# Patient Record
Sex: FEMALE | Race: WHITE | Employment: FULL TIME | URBAN - METROPOLITAN AREA
[De-identification: names, ages, dates, MRNs, and addresses within clinical notes are randomized per-mention and may not be internally consistent; named-entity substitution may affect disease eponyms.]

---

## 2017-07-12 ENCOUNTER — OFFICE VISIT (OUTPATIENT)
Dept: FAMILY MEDICINE | Facility: CLINIC | Age: 37
End: 2017-07-12
Payer: COMMERCIAL

## 2017-07-12 VITALS
SYSTOLIC BLOOD PRESSURE: 124 MMHG | DIASTOLIC BLOOD PRESSURE: 74 MMHG | BODY MASS INDEX: 32.47 KG/M2 | WEIGHT: 194.9 LBS | RESPIRATION RATE: 15 BRPM | HEART RATE: 88 BPM | HEIGHT: 65 IN | OXYGEN SATURATION: 99 % | TEMPERATURE: 97.3 F

## 2017-07-12 DIAGNOSIS — A49.01 INFECTION DUE TO STAPHYLOCOCCUS AUREUS: ICD-10-CM

## 2017-07-12 DIAGNOSIS — L30.9 ECZEMA, UNSPECIFIED TYPE: Primary | ICD-10-CM

## 2017-07-12 PROCEDURE — 99214 OFFICE O/P EST MOD 30 MIN: CPT | Performed by: FAMILY MEDICINE

## 2017-07-12 RX ORDER — MUPIROCIN 20 MG/G
OINTMENT TOPICAL 3 TIMES DAILY
Qty: 22 G | Refills: 1 | Status: SHIPPED | OUTPATIENT
Start: 2017-07-12 | End: 2017-07-17

## 2017-07-12 RX ORDER — SULFAMETHOXAZOLE/TRIMETHOPRIM 800-160 MG
1 TABLET ORAL 2 TIMES DAILY
Qty: 20 TABLET | Refills: 0 | Status: SHIPPED | OUTPATIENT
Start: 2017-07-12 | End: 2017-09-11

## 2017-07-12 RX ORDER — SULFAMETHOXAZOLE/TRIMETHOPRIM 800-160 MG
1 TABLET ORAL 2 TIMES DAILY
Qty: 20 TABLET | Refills: 0 | Status: CANCELLED | OUTPATIENT
Start: 2017-07-12

## 2017-07-12 RX ORDER — HYDROCORTISONE VALERATE 2 MG/G
OINTMENT TOPICAL
Qty: 15 G | Refills: 0 | Status: SHIPPED | OUTPATIENT
Start: 2017-07-12 | End: 2017-09-11

## 2017-07-12 NOTE — PROGRESS NOTES
SUBJECTIVE:                                                    Andria Silva is a 37 year old female who presents to clinic today for the following health issues:      RED SWOLLEN EYES      Duration: 2 weeks    Description (location/character/radiation): both eyes are red and swollen    Intensity:  severe    Accompanying signs and symptoms: none    History (similar episodes/previous evaluation): has had similar issue in the past    Precipitating or alleviating factors: pt thought that it might possibly be eczema    Therapies tried and outcome: None           Problem list and histories reviewed & adjusted, as indicated.  Additional history: as documented    Patient Active Problem List   Diagnosis     Migraines     Recurrent major depression in partial remission (H)     Eczema     Acne     CARDIOVASCULAR SCREENING; LDL GOAL LESS THAN 160     GERD (gastroesophageal reflux disease)     Insomnia     Right wrist pain     Past Surgical History:   Procedure Laterality Date     C NONSPECIFIC PROCEDURE  1997    wisdom teeth pulled     EXCISE MASS UPPER EXTREMITY  10/11/2013    Procedure: EXCISE MASS UPPER EXTREMITY;  EXCISION LEFT ARM MASS;  Surgeon: Joce Kruse MD;  Location: [facility]     HEAD & NECK SURGERY         Social History   Substance Use Topics     Smoking status: Former Smoker     Packs/day: 0.50     Years: 7.00     Types: Cigarettes     Quit date: 9/9/2011     Smokeless tobacco: Never Used      Comment: one cigarette a day     Alcohol use 0.0 oz/week     0 Standard drinks or equivalent per week      Comment: socially     Family History   Problem Relation Age of Onset     HEART DISEASE Father      Alcohol/Drug Father      Depression Father      Depression Mother      Thyroid Disease Mother      Depression Maternal Grandmother      Neurologic Disorder Maternal Grandmother      Neurologic Disorder Maternal Grandfather      Aneurysm Maternal Uncle      Brain Hemorrhage Maternal Aunt          Current  Outpatient Prescriptions   Medication Sig Dispense Refill     sulfamethoxazole-trimethoprim (BACTRIM DS/SEPTRA DS) 800-160 MG per tablet Take 1 tablet by mouth 2 times daily 20 tablet 0     mupirocin (BACTROBAN) 2 % ointment Apply topically 3 times daily for 5 days 22 g 1     hydrocortisone valerate (WEST-JOHNNY) 0.2 % ointment Apply sparingly to affected area three times daily for 14 days. 15 g 0     escitalopram (LEXAPRO) 20 MG tablet        clobetasol (TEMOVATE) 0.05 % ointment        FINACEA 15 % gel        UNABLE TO FIND MEDICATION NAME: Benzyl peroxide wash 5% from derm       ZOLMitriptan (ZOMIG) 5 MG tablet Take 1 tablet (5 mg) by mouth at onset of headache for migraine May repeat dose in 2 hours.  Do not exceed 10 mg in 24 hours 18 tablet 1     SUMAtriptan (IMITREX) 50 MG tablet Take 1 tablet by mouth. WITH ONSET OF MIGRAINE, MAY REPEAT ONCE AFTER 2 HOURS. DO NOT EXCEED 4 TABLETS IN 24 HOURS 15 tablet 4     No Known Allergies  Recent Labs   Lab Test  12/21/16   1608  10/21/14   0919  07/24/12   1149   LDL   --    --   89   HDL   --    --   82   TRIG   --    --   75   ALT   --   16   --    CR   --   0.69   --    GFRESTIMATED   --   >90  Non  GFR Calc     --    GFRESTBLACK   --   >90   GFR Calc     --    POTASSIUM   --   4.5   --    TSH  1.38  1.70  1.82      BP Readings from Last 3 Encounters:   07/12/17 124/74   12/21/16 102/74   05/03/16 109/76    Wt Readings from Last 3 Encounters:   07/12/17 194 lb 14.4 oz (88.4 kg)   12/21/16 194 lb 11.2 oz (88.3 kg)   05/03/16 185 lb (83.9 kg)                  Labs reviewed in EPIC    Reviewed and updated as needed this visit by clinical staff  Tobacco  Allergies  Meds  Med Hx  Surg Hx  Fam Hx       Reviewed and updated as needed this visit by Provider         ROS:  Constitutional, HEENT, cardiovascular, pulmonary, GI, , musculoskeletal, neuro, skin, endocrine and psych systems are negative, except as otherwise noted.    OBJECTIVE:  "    /74  Pulse 88  Temp 97.3  F (36.3  C) (Oral)  Resp 15  Ht 5' 4.5\" (1.638 m)  Wt 194 lb 14.4 oz (88.4 kg)  LMP 06/30/2017 (Exact Date)  SpO2 99%  Breastfeeding? No  BMI 32.94 kg/m2  Body mass index is 32.94 kg/(m^2).  GENERAL: healthy, alert and no distress  EYES: Eyes grossly normal to inspection EXCEPT there is erythema on the upper and lower eye lids and also some cracking of the skin on the eyelids, there is no conjunctival injection and no crusting , no tearing   HENT: ear canals and TM's normal, nose and mouth without ulcers or lesions  NECK: no adenopathy, no asymmetry, masses, or scars and thyroid normal to palpation  MS: no gross musculoskeletal defects noted, no edema    Diagnostic Test Results:  none     ASSESSMENT/PLAN:       1. Infection due to Staphylococcus aureus  Since she did have an infection from Staph on the same right eyelid and also progressed to the left one over time ( see OV notes from a year ago ) , will start oral Abx and also bactroban topically but I have discussed with the pt if there is no improvement in symptoms in the next day or two she would need to start the topical steroid ointment since she does have a hx of eczema on the hands ( never had it on the eyelids before ) .  - sulfamethoxazole-trimethoprim (BACTRIM DS/SEPTRA DS) 800-160 MG per tablet; Take 1 tablet by mouth 2 times daily  Dispense: 20 tablet; Refill: 0  - mupirocin (BACTROBAN) 2 % ointment; Apply topically 3 times daily for 5 days  Dispense: 22 g; Refill: 1    2. Eczema, unspecified type  As above , this one is if the Abx does not work .  - hydrocortisone valerate (WEST-JOHNNY) 0.2 % ointment; Apply sparingly to affected area three times daily for 14 days.  Dispense: 15 g; Refill: 0    RTC if no improving or worsening.  Pt is aware  and comfortable with the current plan.      Isha Forbes MD  [facility]    "

## 2017-07-12 NOTE — MR AVS SNAPSHOT
"              After Visit Summary   2017    Andria Silva    MRN: [id number]           Patient Information     Date Of Birth          1980        Visit Information        Provider Department      2017 2:30 PM Isha Forbes MD [facility]        Today's Diagnoses     Eczema, unspecified type    -  1    Infection due to Staphylococcus aureus           Follow-ups after your visit        Who to contact     If you have questions or need follow up information about today's clinic visit or your schedule please contact [facility] directly at [phone].  Normal or non-critical lab and imaging results will be communicated to you by NetEffecthart, letter or phone within 4 business days after the clinic has received the results. If you do not hear from us within 7 days, please contact the clinic through NetEffecthart or phone. If you have a critical or abnormal lab result, we will notify you by phone as soon as possible.  Submit refill requests through Simulmedia or call your pharmacy and they will forward the refill request to us. Please allow 3 business days for your refill to be completed.          Additional Information About Your Visit        MyChart Information     Simulmedia lets you send messages to your doctor, view your test results, renew your prescriptions, schedule appointments and more. To sign up, go to [email or web address] . Click on \"Log in\" on the left side of the screen, which will take you to the Welcome page. Then click on \"Sign up Now\" on the right side of the page.     You will be asked to enter the access code listed below, as well as some personal information. Please follow the directions to create your username and password.     Your access code is: [email or web address]  Expires: 10/13/2017  4:49 PM     Your access code will  in 90 days. If you need help or a new code, please call your [facility] or [phone].        Care EveryWhere ID     This is your Care " "EveryWhere ID. This could be used by other organizations to access your [place] medical records  [id number]        Your Vitals Were     Pulse Temperature Respirations Height Last Period Pulse Oximetry    88 97.3  F (36.3  C) (Oral) 15 5' 4.5\" (1.638 m) 06/30/2017 (Exact Date) 99%    Breastfeeding? BMI (Body Mass Index)                No 32.94 kg/m2           Blood Pressure from Last 3 Encounters:   07/12/17 124/74   12/21/16 102/74   05/03/16 109/76    Weight from Last 3 Encounters:   07/12/17 194 lb 14.4 oz (88.4 kg)   12/21/16 194 lb 11.2 oz (88.3 kg)   05/03/16 185 lb (83.9 kg)              Today, you had the following     No orders found for display         Today's Medication Changes          These changes are accurate as of: 7/12/17 11:59 PM.  If you have any questions, ask your nurse or doctor.               Start taking these medicines.        Dose/Directions    hydrocortisone valerate 0.2 % ointment   Commonly known as:  WEST-JOHNNY   Used for:  Eczema, unspecified type   Started by:  Isha Forbes MD        Apply sparingly to affected area three times daily for 14 days.   Quantity:  15 g   Refills:  0       mupirocin 2 % ointment   Commonly known as:  BACTROBAN   Used for:  Infection due to Staphylococcus aureus   Started by:  Isha Forbes MD        Apply topically 3 times daily for 5 days   Quantity:  22 g   Refills:  1       sulfamethoxazole-trimethoprim 800-160 MG per tablet   Commonly known as:  BACTRIM DS/SEPTRA DS   Used for:  Infection due to Staphylococcus aureus   Started by:  Isha Forbes MD        Dose:  1 tablet   Take 1 tablet by mouth 2 times daily   Quantity:  20 tablet   Refills:  0            Where to get your medicines      These medications were sent to [facility] [id number] [place] [address] [address] [address] AT [facility] [address]  [address] [place] [address]     Phone:  [phone]     hydrocortisone valerate 0.2 % ointment    mupirocin 2 % ointment    " sulfamethoxazole-trimethoprim 800-160 MG per tablet                Primary Care Provider Office Phone # Fax #    Emy Moss [phone]       [facility] [address] [facility]  [address]  [facility] [address]        Equal Access to Services     MESFIN VALLE : Hadii aad ku hadasho Soomaali, waaxda luqadaha, qaybta kaalmada adeegyada, waxay idiin haykwadwon adeilda mosher blair carmona. So [facility] [phone].    ATENCIÓN: Si habla español, tiene a escamilla disposición servicios gratuitos de asistencia lingüística. Brightame al [phone].    We comply with applicable federal civil rights laws and [place] laws. We do not discriminate on the basis of race, color, national origin, age, disability sex, sexual orientation or gender identity.            Thank you!     Thank you for choosing [facility]  for your care. Our goal is always to provide you with excellent care. Hearing back from our patients is one way we can continue to improve our services. Please take a few minutes to complete the written survey that you may receive in the mail after your visit with us. Thank you!             Your Updated Medication List - Protect others around you: Learn how to safely use, store and throw away your medicines at www.disposemymeds.org.          This list is accurate as of: 7/12/17 11:59 PM.  Always use your most recent med list.                   Brand Name Dispense Instructions for use Diagnosis    clobetasol 0.05 % ointment    TEMOVATE          escitalopram 20 MG tablet    LEXAPRO          FINACEA 15 % gel   Generic drug:  Azelaic Acid           hydrocortisone valerate 0.2 % ointment    WEST-JOHNNY    15 g    Apply sparingly to affected area three times daily for 14 days.    Eczema, unspecified type       mupirocin 2 % ointment    BACTROBAN    22 g    Apply topically 3 times daily for 5 days    Infection due to Staphylococcus aureus       sulfamethoxazole-trimethoprim 800-160 MG per tablet    BACTRIM DS/SEPTRA  DS    20 tablet    Take 1 tablet by mouth 2 times daily    Infection due to Staphylococcus aureus       SUMAtriptan 50 MG tablet    IMITREX    15 tablet    Take 1 tablet by mouth. WITH ONSET OF MIGRAINE, MAY REPEAT ONCE AFTER 2 HOURS. DO NOT EXCEED 4 TABLETS IN 24 HOURS    Migraines       UNABLE TO FIND      MEDICATION NAME: Benzyl peroxide wash 5% from derm        ZOLMitriptan 5 MG tablet    ZOMIG    18 tablet    Take 1 tablet (5 mg) by mouth at onset of headache for migraine May repeat dose in 2 hours.  Do not exceed 10 mg in 24 hours    Migraines

## 2017-09-11 ENCOUNTER — TELEPHONE (OUTPATIENT)
Dept: FAMILY MEDICINE | Facility: CLINIC | Age: 37
End: 2017-09-11

## 2017-09-11 ENCOUNTER — OFFICE VISIT (OUTPATIENT)
Dept: FAMILY MEDICINE | Facility: CLINIC | Age: 37
End: 2017-09-11
Payer: COMMERCIAL

## 2017-09-11 VITALS
HEART RATE: 95 BPM | HEIGHT: 65 IN | SYSTOLIC BLOOD PRESSURE: 104 MMHG | BODY MASS INDEX: 31.82 KG/M2 | WEIGHT: 191 LBS | RESPIRATION RATE: 14 BRPM | OXYGEN SATURATION: 96 % | DIASTOLIC BLOOD PRESSURE: 72 MMHG | TEMPERATURE: 100.3 F

## 2017-09-11 DIAGNOSIS — R07.0 THROAT PAIN: Primary | ICD-10-CM

## 2017-09-11 LAB
DEPRECATED S PYO AG THROAT QL EIA: NORMAL
SPECIMEN SOURCE: NORMAL

## 2017-09-11 PROCEDURE — 99213 OFFICE O/P EST LOW 20 MIN: CPT | Performed by: FAMILY MEDICINE

## 2017-09-11 PROCEDURE — 87880 STREP A ASSAY W/OPTIC: CPT | Performed by: FAMILY MEDICINE

## 2017-09-11 PROCEDURE — 87081 CULTURE SCREEN ONLY: CPT | Performed by: FAMILY MEDICINE

## 2017-09-11 NOTE — LETTER
Andria Silva  [address] [facility] [address]      Dear Andria Silva,    This is just a formal notice that your final strep culture was negative.    Please call if you have any additional questions,    Sincerely,    Alfonso Blank MD MPH

## 2017-09-11 NOTE — PROGRESS NOTES
"  SUBJECTIVE:   Andria Silva is a 37 year old female who presents to clinic today for the following health issues:      Throat pain      Duration: 3-4 days-    Description (location/character/radiation): bi-lat throat pain,fatigue, fever, chills, sweats, muscle aches,     Intensity:  moderate    Accompanying signs and symptoms: fatigue, fever, chills, sweats, muscle aches,    History (similar episodes/previous evaluation): None    Precipitating or alleviating factors: was on vacation in [place] when started feeling sick    Therapies tried and outcome: Advil, Tylenol, fluids, rest     Other symptoms:    No Nausea/vomiting. No rash.  HEENT, CV, Resp, GI, Skin, review of symptoms except as noted above is otherwise negative  Allergies: Review of patient's allergies indicates no known allergies.  I have reviewed the patient's medical history in detail; there are no changes to the history as noted in EpicCare.    OBJECTIVE:  /72  Pulse 95  Temp 100.3  F (37.9  C) (Oral)  Resp 14  Ht 5' 4.5\" (1.638 m)  Wt 191 lb (86.6 kg)  SpO2 96%  Breastfeeding? No  BMI 32.28 kg/m2  Appears mild distress.  Eyes: no conjunctivitis  Ears: normal bilateral TM's and external ear canals, normal  Oropharynx: erythema, no palate petechia  Neck: no adenopathy  Skin: no rash  Lungs: chest clear, no wheezing, rales, normal symmetric air entry, clear to IPPA  Rapid Strep test is:neg      ASSESSMENT/PLAN:    ICD-10-CM    1. Throat pain R07.0 Rapid strep screen       Gargle, use acetaminophen or other OTC analgesic. Call if other family members develop similar symptoms. See prn.  Alfonso Blank MD MPH    "

## 2017-09-11 NOTE — MR AVS SNAPSHOT
"              After Visit Summary   2017    Andria Silva    MRN: [id number]           Patient Information     Date Of Birth          1980        Visit Information        Provider Department      2017 3:30 PM Alfonso Blank MD [facility]        Today's Diagnoses     Throat pain    -  1       Follow-ups after your visit        Who to contact     If you have questions or need follow up information about today's clinic visit or your schedule please contact [facility] directly at [phone].  Normal or non-critical lab and imaging results will be communicated to you by Transfer Tohart, letter or phone within 4 business days after the clinic has received the results. If you do not hear from us within 7 days, please contact the clinic through Queue Software Inct or phone. If you have a critical or abnormal lab result, we will notify you by phone as soon as possible.  Submit refill requests through OneID or call your pharmacy and they will forward the refill request to us. Please allow 3 business days for your refill to be completed.          Additional Information About Your Visit        MyChart Information     OneID lets you send messages to your doctor, view your test results, renew your prescriptions, schedule appointments and more. To sign up, go to [email or web address] . Click on \"Log in\" on the left side of the screen, which will take you to the Welcome page. Then click on \"Sign up Now\" on the right side of the page.     You will be asked to enter the access code listed below, as well as some personal information. Please follow the directions to create your username and password.     Your access code is: [email or web address]  Expires: 10/13/2017  4:49 PM     Your access code will  in 90 days. If you need help or a new code, please call your [place] clinic or [phone].        Care EveryWhere ID     This is your Care EveryWhere ID. This could be used by other organizations " "to access your [place] medical records  [id number]        Your Vitals Were     Pulse Temperature Respirations Height Pulse Oximetry Breastfeeding?    95 100.3  F (37.9  C) (Oral) 14 5' 4.5\" (1.638 m) 96% No    BMI (Body Mass Index)                   32.28 kg/m2            Blood Pressure from Last 3 Encounters:   09/11/17 104/72   07/12/17 124/74   12/21/16 102/74    Weight from Last 3 Encounters:   09/11/17 191 lb (86.6 kg)   07/12/17 194 lb 14.4 oz (88.4 kg)   12/21/16 194 lb 11.2 oz (88.3 kg)              We Performed the Following     Beta strep group A culture     DEPRESSION ACTION PLAN (DAP)     Rapid strep screen        Primary Care Provider Office Phone # Fax #    Emy FUENTES Ajay  [phone] [address] [address]        Equal Access to Services     [facility] HospitalJOSE LUIS : Hadii aad ku hadasho Soomaali, waaxda luqadaha, qaybta kaalmada adeegyada, waxay idiin haykwadwon siena pinto . So [facility] [phone].    ATENCIÓN: Si habla español, tiene a escamilla disposición servicios gratuitos de asistencia lingüística. Llame al [phone].    We comply with applicable federal civil rights laws and [place] laws. We do not discriminate on the basis of race, color, national origin, age, disability sex, sexual orientation or gender identity.            Thank you!     Thank you for choosing [facility]  for your care. Our goal is always to provide you with excellent care. Hearing back from our patients is one way we can continue to improve our services. Please take a few minutes to complete the written survey that you may receive in the mail after your visit with us. Thank you!             Your Updated Medication List - Protect others around you: Learn how to safely use, store and throw away your medicines at www.disposemymeds.org.          This list is accurate as of: 9/11/17 11:59 PM.  Always use your most recent med list.                   Brand Name Dispense " Instructions for use Diagnosis    clobetasol 0.05 % ointment    TEMOVATE          escitalopram 20 MG tablet    LEXAPRO          ZOLMitriptan 5 MG tablet    ZOMIG    18 tablet    Take 1 tablet (5 mg) by mouth at onset of headache for migraine May repeat dose in 2 hours.  Do not exceed 10 mg in 24 hours    Migraines

## 2017-09-11 NOTE — TELEPHONE ENCOUNTER
Patient has been ill for ~ 3 days.  102 temp  Having chills and sweats  Throat swollen per pt; denies breathing difficulties though  When feels throat, swelling noted to lymph nodes  States she just feels awful  Would like to be seen and tested for strep    Next 5 appointments (look out 90 days)     Sep 11, 2017  3:30 PM CDT   SHORT with Alfonso Blank MD   [facility]    [address] [place]  [facility] [address] [phone]                Debora LOPEZ, RN

## 2017-09-11 NOTE — NURSING NOTE
"Chief Complaint   Patient presents with     Throat Pain       Initial /72  Pulse 95  Temp 100.3  F (37.9  C) (Oral)  Resp 14  Ht 5' 4.5\" (1.638 m)  Wt 191 lb (86.6 kg)  SpO2 96%  Breastfeeding? No  BMI 32.28 kg/m2 Estimated body mass index is 32.28 kg/(m^2) as calculated from the following:    Height as of this encounter: 5' 4.5\" (1.638 m).    Weight as of this encounter: 191 lb (86.6 kg).  BP completed using cuff size: regular    Health Maintenance that is potentially due pending provider review:  Health Maintenance Due   Topic Date Due     DEPRESSION ACTION PLAN Q1 YR  12/09/2015     PHQ-9 Q6 MONTHS  06/21/2017     INFLUENZA VACCINE (SYSTEM ASSIGNED)  09/01/2017         PHQ/ACT/JESÚS--Gave pt questionnare and DAP ordered  "

## 2017-09-11 NOTE — TELEPHONE ENCOUNTER
Reason for call:  Patient reporting a symptom    Symptom or request: fever, chills, body aches, swollen throat    Duration (how long have symptoms been present): 3 days    Have you been treated for this before? Yes    Additional comments: wondering if it might be strep    Phone Number patient can be reached at:  Home number on file [phone] (home)    Best Time:  anytime    Can we leave a detailed message on this number:  YES    Call taken on 9/11/2017 at 2:56 PM by Juliette Flores

## 2017-09-12 LAB
BACTERIA SPEC CULT: NORMAL
SPECIMEN SOURCE: NORMAL

## 2018-01-11 ENCOUNTER — TELEPHONE (OUTPATIENT)
Dept: FAMILY MEDICINE | Facility: CLINIC | Age: 38
End: 2018-01-11

## 2018-01-11 NOTE — TELEPHONE ENCOUNTER
Reason for call:  Patient reporting a symptom    Symptom or request: bump on arm     Duration (how long have symptoms been present): suddenly last    Have you been treated for this before? Yes    Additional comments:  Pt wants to know if she should be seen bump on arm pt ? Pt states sore to the touch,small raised bump in the middle. please advise    Phone Number patient can be reached at:  Home number on file [phone] (home)    Best Time:  any    Can we leave a detailed message on this number:  YES    Call taken on 1/11/2018 at 9:41 AM by Jono Marsh

## 2018-01-11 NOTE — TELEPHONE ENCOUNTER
SN,  Please advise in PN's absence.    Last night pt found bump on (L) forearm  Bump is the size of pinhead  Area surrounding is sensitive to the touch  Pain/sensitivity radiates into elbow also   Area is red and raised   Area not warm  Afebrile    States chest feels little tight - likely d/t nervousness/anxiety of bump she found she said    Patient is just worried because she is pregnant  Pregnancy managed at [facility] - states they advised she call PCP regarding this issue    Ok to continue monitoring right?  No other bumps on body and small bump does not sound concerning  Please confirm  Thanks,  Debora LOPEZ RN

## 2018-02-01 ENCOUNTER — NURSE TRIAGE (OUTPATIENT)
Dept: NURSING | Facility: CLINIC | Age: 38
End: 2018-02-01

## 2018-02-02 NOTE — TELEPHONE ENCOUNTER
Additional Information    Negative: Severe difficulty breathing (e.g., struggling for each breath, speaks in single words)    Negative: Bluish lips, tongue, or face now    Negative: Shock suspected (e.g., cold/pale/clammy skin, too weak to stand, low BP, rapid pulse)    Negative: Sounds like a life-threatening emergency to the triager    Negative: Severe sore throat    Negative: [1] Doesn't match the criteria for Influenza AND [2] sounds like a cold    Negative: Influenza vaccine reaction is suspected    Negative: Chest pain  (Exception: MILD central chest pain, present only when coughing)    Negative: [1] Headache AND [2] stiff neck (can't touch chin to chest)    Negative: Fever > 104 F (40 C)    Negative: [1] Difficulty breathing AND [2] not severe AND [3] not from stuffy nose (e.g., not relieved by cleaning out the nose)    Negative: Patient sounds very sick or weak to the triager    Negative: [1] Fever > 101 F (38.3 C) AND [2] age > 60    Negative: [1] Fever > 101 F (38.3 C) AND [2] bedridden (e.g., nursing home patient, CVA, chronic illness, recovering from surgery)    Negative: [1] Fever > 100.5 F (38.1 C) AND [2] diabetes mellitus or weak immune system (e.g., HIV positive, cancer chemo, splenectomy, organ transplant, chronic steroids)    Negative: Patient is HIGH RISK (e.g., age > 64 years, pregnant, HIV+, or chronic medical condition)    Negative: Fever present > 3 days (72 hours)    Negative: [1] Fever returns after gone for over 24 hours AND [2] symptoms worse or not improved    Negative: [1] Using nasal washes and pain medicine > 24 hours AND [2] sinus pain (around cheekbone or eye) persists    Negative: Earache    Negative: [1] Patient is NOT HIGH RISK AND [2] strongly requests antiviral medicine AND [3] flu symptoms present < 48 hours    Negative: [1] Nasal discharge AND [2] present > 10 days    Negative: Cough present > 3 weeks    [1] Probable influenza (fever) with no complications AND [2] NOT HIGH  "RISK (all triage questions negative)     \"I think I might have the flu. Sx are body aches, nausea and headache and temp 100.0. I am taking Tylenol. Yesterday I also had diarrhea a couple of times.\" Denies other sx.Went over influenza sx. Gave home care advice, call back if needed.    Protocols used: INFLUENZA - SEASONAL-ADULT-    "

## 2018-03-21 ENCOUNTER — OFFICE VISIT (OUTPATIENT)
Dept: FAMILY MEDICINE | Facility: CLINIC | Age: 38
End: 2018-03-21
Payer: COMMERCIAL

## 2018-03-21 VITALS
WEIGHT: 174 LBS | DIASTOLIC BLOOD PRESSURE: 80 MMHG | HEART RATE: 73 BPM | OXYGEN SATURATION: 99 % | TEMPERATURE: 98 F | SYSTOLIC BLOOD PRESSURE: 110 MMHG | BODY MASS INDEX: 29.41 KG/M2

## 2018-03-21 DIAGNOSIS — J06.9 ACUTE URI: Primary | ICD-10-CM

## 2018-03-21 PROCEDURE — 99213 OFFICE O/P EST LOW 20 MIN: CPT | Performed by: INTERNAL MEDICINE

## 2018-03-21 NOTE — NURSING NOTE
"Chief Complaint   Patient presents with     Nasal Congestion       Initial /80 (BP Location: Left arm, Patient Position: Chair, Cuff Size: Adult Regular)  Pulse 73  Temp 98  F (36.7  C) (Tympanic)  Wt 174 lb (78.9 kg)  LMP 06/30/2017 (Exact Date)  SpO2 99%  BMI 29.41 kg/m2 Estimated body mass index is 29.41 kg/(m^2) as calculated from the following:    Height as of 9/11/17: 5' 4.5\" (1.638 m).    Weight as of this encounter: 174 lb (78.9 kg).  Medication Reconciliation: complete  "

## 2018-03-21 NOTE — MR AVS SNAPSHOT
"              After Visit Summary   3/21/2018    Andria Silva    MRN: [id number]           Patient Information     Date Of Birth          1980        Visit Information        Provider Department      3/21/2018 10:40 AM Angella Collazo MD [facility]        Care Instructions    You can try Edinson med sinus rinse and flonase nasal spray.           Follow-ups after your visit        Follow-up notes from your care team     Return if symptoms worsen or fail to improve.      Who to contact     If you have questions or need follow up information about today's clinic visit or your schedule please contact [facility] directly at [phone].  Normal or non-critical lab and imaging results will be communicated to you by MyChart, letter or phone within 4 business days after the clinic has received the results. If you do not hear from us within 7 days, please contact the clinic through MyChart or phone. If you have a critical or abnormal lab result, we will notify you by phone as soon as possible.  Submit refill requests through [employer] or call your pharmacy and they will forward the refill request to us. Please allow 3 business days for your refill to be completed.          Additional Information About Your Visit        MyChart Information     [employer] lets you send messages to your doctor, view your test results, renew your prescriptions, schedule appointments and more. To sign up, go to [email or web address] . Click on \"Log in\" on the left side of the screen, which will take you to the Welcome page. Then click on \"Sign up Now\" on the right side of the page.     You will be asked to enter the access code listed below, as well as some personal information. Please follow the directions to create your username and password.     Your access code is: [email or web address]  Expires: 2018 10:54 AM     Your access code will  in 90 days. If you need help or a new code, please call " your [facility] clinic or [phone].        Care EveryWhere ID     This is your Care EveryWhere ID. This could be used by other organizations to access your [facility] medical records  [id number]        Your Vitals Were     Pulse Temperature Last Period Pulse Oximetry BMI (Body Mass Index)       73 98  F (36.7  C) (Tympanic) 06/30/2017 (Exact Date) 99% 29.41 kg/m2        Blood Pressure from Last 3 Encounters:   03/21/18 110/80   09/11/17 104/72   07/12/17 124/74    Weight from Last 3 Encounters:   03/21/18 174 lb (78.9 kg)   09/11/17 191 lb (86.6 kg)   07/12/17 194 lb 14.4 oz (88.4 kg)              Today, you had the following     No orders found for display       Primary Care Provider Office Phone # Fax #    Emy FUENTES DO Ajay [phone] [address] [address]        Equal Access to Services     MALENA VALLE : Hadii aad ku hadasho Soomaali, waaxda luqadaha, qaybta kaalmada adeegyada, waxay idiin haykwadwon siena pinto . So [facility] [phone].    ATENCIÓN: Si habla español, tiene a escamilla disposición servicios gratuitos de asistencia lingüística. Llame al [phone].    We comply with applicable federal civil rights laws and [place] laws. We do not discriminate on the basis of race, color, national origin, age, disability, sex, sexual orientation, or gender identity.            Thank you!     Thank you for choosing [facility]  for your care. Our goal is always to provide you with excellent care. Hearing back from our patients is one way we can continue to improve our services. Please take a few minutes to complete the written survey that you may receive in the mail after your visit with us. Thank you!             Your Updated Medication List - Protect others around you: Learn how to safely use, store and throw away your medicines at www.disposemymeds.org.          This list is accurate as of 3/21/18 10:54 AM.  Always use your most recent med list.                    Brand Name Dispense Instructions for use Diagnosis    clobetasol 0.05 % ointment    TEMOVATE          escitalopram 20 MG tablet    LEXAPRO          ZOLMitriptan 5 MG tablet    ZOMIG    18 tablet    Take 1 tablet (5 mg) by mouth at onset of headache for migraine May repeat dose in 2 hours.  Do not exceed 10 mg in 24 hours    Migraines

## 2018-03-21 NOTE — PROGRESS NOTES
SUBJECTIVE:   Andria Silva is a 38 year old female who presents to clinic today for the following health issues:      Acute Illness   Acute illness concerns: cough, congestion   Onset: Friday     Fever: no    Chills/Sweats: no    Headache (location?): YES    Sinus Pressure:no    Conjunctivitis:  no    Ear Pain: YES: right    Rhinorrhea: YES    Congestion: YES    Sore Throat: YES     Cough: YES    Wheeze: no    Decreased Appetite: YES    Nausea: YES- pregnancy     Vomiting: no    Diarrhea:  YES    Dysuria/Freq.: no    Fatigue/Achiness: YES    Sick/Strep Exposure: no     Therapies Tried and outcome: robitussin dm     Andria is here with chest and sinus congestion for about 5-6 days.  No fevers, no SOB.  Feels fatigued.  Today seems to be a little better.  She is 9 weeks pregnant.  Since she is having some green mucous come up with her cough, she was advised by her OB-gyn to come in for a visit.         Reviewed and updated as needed this visit by clinical staff  Tobacco  Allergies  Meds       Reviewed and updated as needed this visit by Provider         ROS:  Constitutional, HEENT, cardiovascular, pulmonary, gi and gu systems are negative, except as otherwise noted.    OBJECTIVE:     /80 (BP Location: Left arm, Patient Position: Chair, Cuff Size: Adult Regular)  Pulse 73  Temp 98  F (36.7  C) (Tympanic)  Wt 174 lb (78.9 kg)  LMP 06/30/2017 (Exact Date)  SpO2 99%  BMI 29.41 kg/m2  Body mass index is 29.41 kg/(m^2).    Gen: well appearing, pleasant woman, no distress  HEENT: PERRL, no conjunctival injection, no posterior pharynx erythema, MMM.  TM normal b/l.  No sinus tenderness.   Neck: supple, no LAD  Pulm: breathing comfortably, CTAB, no wheezes or rales  CV: RRR, normal S1 and S2, no murmurs  Ext: 2+ radial pulses        Diagnostic Test Results:  none     ASSESSMENT/PLAN:         ICD-10-CM    1. Acute URI J06.9      No evidence for bacterial infection.  Recommended supportive care.         Follow up if no improvement or worsening in the next 3-5 days.         Angella Collazo MD  [facility]

## 2018-03-27 ENCOUNTER — OFFICE VISIT (OUTPATIENT)
Dept: FAMILY MEDICINE | Facility: CLINIC | Age: 38
End: 2018-03-27
Payer: COMMERCIAL

## 2018-03-27 ENCOUNTER — TELEPHONE (OUTPATIENT)
Dept: FAMILY MEDICINE | Facility: CLINIC | Age: 38
End: 2018-03-27

## 2018-03-27 VITALS
RESPIRATION RATE: 17 BRPM | OXYGEN SATURATION: 99 % | BODY MASS INDEX: 28.79 KG/M2 | DIASTOLIC BLOOD PRESSURE: 78 MMHG | HEIGHT: 65 IN | HEART RATE: 88 BPM | WEIGHT: 172.8 LBS | TEMPERATURE: 98.6 F | SYSTOLIC BLOOD PRESSURE: 112 MMHG

## 2018-03-27 DIAGNOSIS — J01.00 ACUTE NON-RECURRENT MAXILLARY SINUSITIS: Primary | ICD-10-CM

## 2018-03-27 PROCEDURE — 99213 OFFICE O/P EST LOW 20 MIN: CPT | Performed by: FAMILY MEDICINE

## 2018-03-27 ASSESSMENT — PATIENT HEALTH QUESTIONNAIRE - PHQ9
SUM OF ALL RESPONSES TO PHQ QUESTIONS 1-9: 2
SUM OF ALL RESPONSES TO PHQ QUESTIONS 1-9: 2
10. IF YOU CHECKED OFF ANY PROBLEMS, HOW DIFFICULT HAVE THESE PROBLEMS MADE IT FOR YOU TO DO YOUR WORK, TAKE CARE OF THINGS AT HOME, OR GET ALONG WITH OTHER PEOPLE: NOT DIFFICULT AT ALL

## 2018-03-27 NOTE — MR AVS SNAPSHOT
After Visit Summary   3/27/2018    Andria Silva    MRN: [id number]           Patient Information     Date Of Birth          1980        Visit Information        Provider Department      3/27/2018 11:30 AM Roman Brian MD [facility]        Today's Diagnoses     Acute recurrent maxillary sinusitis    -  1    Acute non-recurrent maxillary sinusitis          Care Instructions      Self-Care for Sinusitis     Drinking plenty of water can help sinuses drain.   Sinusitis can often be managed with self-care. Self-care can keep sinuses moist and make you feel more comfortable. Remember to follow your doctor's instructions closely. This can make a big difference in getting your sinus problem under control.  Drink fluids  Drinking extra fluids helps thin your mucus. This lets it drain from your sinuses more easily. Have a glass of water every hour or two. A humidifier helps in much the same way. Fluids can also offset the drying effects of certain medicines. If you use a humidifier, follow the product maker's instructions on how to use it. Clean it on a regular schedule.  Use saltwater rinses  Rinses help keep your sinuses and nose moist. Mix a teaspoon of salt in 8 ounces of fresh, warm water. Use a bulb syringe to gently squirt the water into your nose a few times a day. You can also buy ready-made saline nasal sprays.  Apply hot or cold packs  Applying heat to the area surrounding your sinuses may make you feel more comfortable. Use a hot water bottle or a hand towel dipped in hot water. Some people also find ice packs effective for relieving pain.  Medicines  Your doctor may prescribe medications to help treat your sinusitis. If you have an infection, antibiotics can help clear it up. If you are prescribed antibiotics, take all pills on schedule until they are gone, even if you feel better. Decongestants help relieve swelling. Use decongestant sprays for short periods only under  "the direction of your doctor. If you have allergies, your doctor may prescribe medications to help relieve them.   Date Last Reviewed: 10/1/2016    4492-9281 [employer]. [address] [place] [address], [place] [address]. All rights reserved. This information is not intended as a substitute for professional medical care. Always follow your healthcare professional's instructions.                Follow-ups after your visit        Who to contact     If you have questions or need follow up information about today's clinic visit or your schedule please contact [facility] directly at [phone].  Normal or non-critical lab and imaging results will be communicated to you by MyChart, letter or phone within 4 business days after the clinic has received the results. If you do not hear from us within 7 days, please contact the clinic through [employer] or phone. If you have a critical or abnormal lab result, we will notify you by phone as soon as possible.  Submit refill requests through [employer] or call your pharmacy and they will forward the refill request to us. Please allow 3 business days for your refill to be completed.          Additional Information About Your Visit        MyChart Information     [employer] lets you send messages to your doctor, view your test results, renew your prescriptions, schedule appointments and more. To sign up, go to [email or web address] . Click on \"Log in\" on the left side of the screen, which will take you to the Welcome page. Then click on \"Sign up Now\" on the right side of the page.     You will be asked to enter the access code listed below, as well as some personal information. Please follow the directions to create your username and password.     Your access code is: [email or web address]  Expires: 2018 10:54 AM     Your access code will  in 90 days. If you need help or a new code, please call your [facility] or [phone].        Care EveryWhere ID     This is " "your Care EveryWhere ID. This could be used by other organizations to access your [facility] medical records  [id number]        Your Vitals Were     Pulse Temperature Respirations Height Last Period Pulse Oximetry    88 98.6  F (37  C) (Tympanic) 17 5' 4.5\" (1.638 m) 06/30/2017 (Exact Date) 99%    Breastfeeding? BMI (Body Mass Index)                No 29.2 kg/m2           Blood Pressure from Last 3 Encounters:   03/27/18 112/78   03/21/18 110/80   09/11/17 104/72    Weight from Last 3 Encounters:   03/27/18 172 lb 12.8 oz (78.4 kg)   03/21/18 174 lb (78.9 kg)   09/11/17 191 lb (86.6 kg)              Today, you had the following     No orders found for display         Today's Medication Changes          These changes are accurate as of 3/27/18 11:47 AM.  If you have any questions, ask your nurse or doctor.               Start taking these medicines.        Dose/Directions    amoxicillin-clavulanate 875-125 MG per tablet   Commonly known as:  AUGMENTIN   Used for:  Acute non-recurrent maxillary sinusitis   Started by:  Roman Brian MD        Dose:  1 tablet   Take 1 tablet by mouth 2 times daily   Quantity:  20 tablet   Refills:  0            Where to get your medicines      These medications were sent to [facility] - [place], [place] - [address] AT [facility] [address]  [address], hospitals [address]     Phone:  [phone]     amoxicillin-clavulanate 875-125 MG per tablet                Primary Care Provider Office Phone # Fax #    Emy Moss [phone] [address] [address]        Equal Access to Services     MALENA VALLE AH: Dewey Conklin, sally lujohn, qachapo kaalmada sienayada, darren pepe So [facility] [phone].    ATENCIÓN: Si habla español, tiene a escamilla disposición servicios gratuitos de asistencia lingüística. Kerry al [phone].    We comply with applicable federal civil rights " laws and [place] laws. We do not discriminate on the basis of race, color, national origin, age, disability, sex, sexual orientation, or gender identity.            Thank you!     Thank you for choosing [facility]  for your care. Our goal is always to provide you with excellent care. Hearing back from our patients is one way we can continue to improve our services. Please take a few minutes to complete the written survey that you may receive in the mail after your visit with us. Thank you!             Your Updated Medication List - Protect others around you: Learn how to safely use, store and throw away your medicines at www.disposemymeds.org.          This list is accurate as of 3/27/18 11:47 AM.  Always use your most recent med list.                   Brand Name Dispense Instructions for use Diagnosis    amoxicillin-clavulanate 875-125 MG per tablet    AUGMENTIN    20 tablet    Take 1 tablet by mouth 2 times daily    Acute non-recurrent maxillary sinusitis       clobetasol 0.05 % ointment    TEMOVATE          escitalopram 20 MG tablet    LEXAPRO          ZOLMitriptan 5 MG tablet    ZOMIG    18 tablet    Take 1 tablet (5 mg) by mouth at onset of headache for migraine May repeat dose in 2 hours.  Do not exceed 10 mg in 24 hours    Migraines

## 2018-03-27 NOTE — TELEPHONE ENCOUNTER
Reason for Call:  Other appointment    Detailed comments: Was seen last week for cold symptoms.  She reports she is not getting better.  She has a cough,  a runny nose, wet cough, pulling muscles due to coughing.  She has been sick not for a week and a half.  She was seen on 3/21/18 in [place].  She is pregnant and would like to know if she needs to come in again.     Phone Number Patient can be reached at: Home number on file [phone] (home)    Best Time: any    Can we leave a detailed message on this number? YES    Call taken on 3/27/2018 at 8:15 AM by Donna Neal

## 2018-03-27 NOTE — TELEPHONE ENCOUNTER
Per OV note 3/21:  Follow up if no improvement or worsening in the next 3-5 days.      Spoke with patient.  Seen 3/21 in EP for URI.  Not any better. Cough continues, runny nose  9 weeks pregnant.  Scheduled patient to see FS today at 11:30    Ann Hoffman RN

## 2018-03-27 NOTE — PROGRESS NOTES
SUBJECTIVE:   Andria Silva is a 38 year old female who presents to clinic today for the following health issues:      Chief Complaint   Patient presents with     RECHECK     URI not improving- please see TE from this a.m. Cough and sinus pain have become worse, very achy and cold, her phlegm used to be clear but has now become green.   The patient presents to clinic for evaluation of worsening nasal congestion.  Currently she is noticing green nasal discharge and worsening dry cough.  Symptoms are associated with severe facial pressure and muscle aches.  Symptoms started approximately 2 weeks ago.  One week after the symptom onset she was seen in clinic and conservative treatment was recommended at that time.  She failed conservative treatment for the past 1 week and desires to start something to alleviate the symptoms.  The patient denies any fevers or chills.  Uses Robitussin-DM from over-the-counter for cough relief. She denies any chest pain, palpitations, shortness of breath, fevers or chills.     Problem list and histories reviewed & adjusted, as indicated.  Additional history: as documented    Patient Active Problem List   Diagnosis     Migraines     Recurrent major depression in partial remission (H)     Eczema     Acne     CARDIOVASCULAR SCREENING; LDL GOAL LESS THAN 160     GERD (gastroesophageal reflux disease)     Insomnia     Right wrist pain     Past Surgical History:   Procedure Laterality Date     C NONSPECIFIC PROCEDURE  1997    wisdom teeth pulled     EXCISE MASS UPPER EXTREMITY  10/11/2013    Procedure: EXCISE MASS UPPER EXTREMITY;  EXCISION LEFT ARM MASS;  Surgeon: Joce Kruse MD;  Location: [facility]     HEAD & NECK SURGERY         Social History   Substance Use Topics     Smoking status: Former Smoker     Packs/day: 0.50     Years: 7.00     Types: Cigarettes     Quit date: 9/9/2011     Smokeless tobacco: Never Used      Comment: one cigarette a day     Alcohol use No     Family  "History   Problem Relation Age of Onset     HEART DISEASE Father      Alcohol/Drug Father      Depression Father      Depression Mother      Thyroid Disease Mother      Depression Maternal Grandmother      Neurologic Disorder Maternal Grandmother      Neurologic Disorder Maternal Grandfather      Aneurysm Maternal Uncle      Brain Hemorrhage Maternal Aunt            Reviewed and updated as needed this visit by clinical staff  Tobacco  Allergies  Meds  Problems  Med Hx  Surg Hx  Fam Hx  Soc Hx        ROS:  Constitutional, HEENT, cardiovascular, pulmonary, gi and gu systems are negative, except as otherwise noted.    OBJECTIVE:     /78  Pulse 88  Temp 98.6  F (37  C) (Tympanic)  Resp 17  Ht 5' 4.5\" (1.638 m)  Wt 172 lb 12.8 oz (78.4 kg)  LMP 06/30/2017 (Exact Date)  SpO2 99%  Breastfeeding? No  BMI 29.2 kg/m2  Body mass index is 29.2 kg/(m^2).  GENERAL: healthy, alert and no distress  EYES: Eyes grossly normal to inspection, PERRL and conjunctivae and sclerae normal  HENT: ear canals and TM's normal.  moderate pharyngeal erythema.  Tenderness to palpation over the maxillary sinus.  RESP: lungs clear to auscultation - no rales, rhonchi or wheezes  CV: regular rate and rhythm, normal S1 S2, no S3 or S4, no murmur, click or rub, no peripheral edema and peripheral pulses strong    Diagnostic Test Results:  No results found for this or any previous visit (from the past 24 hour(s)).    ASSESSMENT/PLAN:   1. Acute non-recurrent maxillary sinusitis  ASSESSMENT: Sinus symptoms and worsening nasal congestion for the past 2 weeks.  Patient is currently 8 weeks pregnant.  Chills, body aches and green nasal discharge is worrisome for bacterial sinusitis rather than viral.  Patient was advised to continue with over-the-counter medications for symptomatic relief.  List of safe medications during pregnancy was given prior to discharge.  She was advised to use a humidifier as well as steam to alleviate " congestion.  Return to clinic if symptoms fails to improve.  PLAN:  - amoxicillin-clavulanate (AUGMENTIN) 875-125 MG per tablet; Take 1 tablet by mouth 2 times daily  Dispense: 20 tablet; Refill: 0    Roman Brian MD  [facility]

## 2018-03-27 NOTE — PATIENT INSTRUCTIONS
Self-Care for Sinusitis     Drinking plenty of water can help sinuses drain.   Sinusitis can often be managed with self-care. Self-care can keep sinuses moist and make you feel more comfortable. Remember to follow your doctor's instructions closely. This can make a big difference in getting your sinus problem under control.  Drink fluids  Drinking extra fluids helps thin your mucus. This lets it drain from your sinuses more easily. Have a glass of water every hour or two. A humidifier helps in much the same way. Fluids can also offset the drying effects of certain medicines. If you use a humidifier, follow the product maker's instructions on how to use it. Clean it on a regular schedule.  Use saltwater rinses  Rinses help keep your sinuses and nose moist. Mix a teaspoon of salt in 8 ounces of fresh, warm water. Use a bulb syringe to gently squirt the water into your nose a few times a day. You can also buy ready-made saline nasal sprays.  Apply hot or cold packs  Applying heat to the area surrounding your sinuses may make you feel more comfortable. Use a hot water bottle or a hand towel dipped in hot water. Some people also find ice packs effective for relieving pain.  Medicines  Your doctor may prescribe medications to help treat your sinusitis. If you have an infection, antibiotics can help clear it up. If you are prescribed antibiotics, take all pills on schedule until they are gone, even if you feel better. Decongestants help relieve swelling. Use decongestant sprays for short periods only under the direction of your doctor. If you have allergies, your doctor may prescribe medications to help relieve them.   Date Last Reviewed: 10/1/2016    1097-4390 [employer]. [address] [place] [address]. All rights reserved. This information is not intended as a substitute for professional medical care. Always follow your healthcare professional's instructions.

## 2018-03-28 ASSESSMENT — PATIENT HEALTH QUESTIONNAIRE - PHQ9: SUM OF ALL RESPONSES TO PHQ QUESTIONS 1-9: 2

## 2018-09-16 ENCOUNTER — HEALTH MAINTENANCE LETTER (OUTPATIENT)
Age: 38
End: 2018-09-16

## 2019-02-06 ENCOUNTER — OFFICE VISIT (OUTPATIENT)
Dept: FAMILY MEDICINE | Facility: CLINIC | Age: 39
End: 2019-02-06
Payer: COMMERCIAL

## 2019-02-06 VITALS
BODY MASS INDEX: 30.82 KG/M2 | DIASTOLIC BLOOD PRESSURE: 83 MMHG | WEIGHT: 185 LBS | SYSTOLIC BLOOD PRESSURE: 126 MMHG | TEMPERATURE: 98.4 F | HEART RATE: 114 BPM | OXYGEN SATURATION: 97 % | HEIGHT: 65 IN | RESPIRATION RATE: 16 BRPM

## 2019-02-06 DIAGNOSIS — J01.00 ACUTE NON-RECURRENT MAXILLARY SINUSITIS: ICD-10-CM

## 2019-02-06 PROCEDURE — 99213 OFFICE O/P EST LOW 20 MIN: CPT | Performed by: FAMILY MEDICINE

## 2019-02-06 RX ORDER — FLUTICASONE PROPIONATE 50 MCG
1 SPRAY, SUSPENSION (ML) NASAL DAILY
Qty: 1 BOTTLE | Refills: 1 | Status: SHIPPED | OUTPATIENT
Start: 2019-02-06 | End: 2020-02-06

## 2019-02-06 RX ORDER — LORATADINE 10 MG/1
10 TABLET, ORALLY DISINTEGRATING ORAL DAILY
Qty: 30 TABLET | Refills: 3 | Status: SHIPPED | OUTPATIENT
Start: 2019-02-06 | End: 2020-02-06

## 2019-02-06 ASSESSMENT — MIFFLIN-ST. JEOR: SCORE: 1522.09

## 2019-02-06 NOTE — NURSING NOTE
"Chief Complaint   Patient presents with     URI     initial /83 (BP Location: Right arm, Cuff Size: Adult Regular)   Pulse 114   Temp 98.4  F (36.9  C) (Oral)   Resp 16   Ht 1.638 m (5' 4.5\")   Wt 83.9 kg (185 lb)   SpO2 97%   Breastfeeding? Yes   BMI 31.26 kg/m   Estimated body mass index is 31.26 kg/m  as calculated from the following:    Height as of this encounter: 1.638 m (5' 4.5\").    Weight as of this encounter: 83.9 kg (185 lb).  BP completed using cuff size: regular.   R arm      Health Maintenance that is potentially due pending provider review:  NONE    n/a    Sergio Chapin ma  "

## 2019-02-06 NOTE — PATIENT INSTRUCTIONS
Treatment of Viral Rhinosinusitis    Self-limited disease - treatment does not shorten the course    Analgesics (NSAIDs, Acetaminophen)    Saline nasal sprays (irrigation)    Topical nasal steroids, like fluticasone up to twice daily for 2 weeks,later as needed         Anti-histamines, ideally non sedating second generation, like loratadine (claritin)     If in next few days, the sinus drainage colored, thicker or sinus headache- then or to start antibiotic for superadded bacterial infection.

## 2019-02-06 NOTE — PROGRESS NOTES
SUBJECTIVE:   Andria Silva is a 38 year old female who presents to clinic today for the following health issues:      RESPIRATORY SYMPTOMS      Duration: 7-10 days     Description  cough, wheezing, fever, headache and hoarse voice    Severity: moderate    Accompanying signs and symptoms: None    History (predisposing factors):  none    Precipitating or alleviating factors: None    Therapies tried and outcome:  rest and fluids      PROBLEMS TO ADD ON...    Problem list and histories reviewed & adjusted, as indicated.  Additional history: as documented    Patient Active Problem List   Diagnosis     Migraines     Recurrent major depression in partial remission (H)     Eczema     Acne     CARDIOVASCULAR SCREENING; LDL GOAL LESS THAN 160     GERD (gastroesophageal reflux disease)     Insomnia     Right wrist pain     Past Surgical History:   Procedure Laterality Date     C NONSPECIFIC PROCEDURE      wisdom teeth pulled     EXCISE MASS UPPER EXTREMITY  10/11/2013    Procedure: EXCISE MASS UPPER EXTREMITY;  EXCISION LEFT ARM MASS;  Surgeon: Joce Kruse MD;  Location: [facility]     HEAD & NECK SURGERY         Social History     Tobacco Use     Smoking status: Former Smoker     Packs/day: 0.50     Years: 7.00     Pack years: 3.50     Types: Cigarettes     Last attempt to quit: 2011     Years since quittin.4     Smokeless tobacco: Never Used     Tobacco comment: one cigarette a day   Substance Use Topics     Alcohol use: No     Alcohol/week: 0.0 oz     Family History   Problem Relation Age of Onset     Heart Disease Father      Alcohol/Drug Father      Depression Father      Depression Mother      Thyroid Disease Mother      Depression Maternal Grandmother      Neurologic Disorder Maternal Grandmother      Neurologic Disorder Maternal Grandfather      Aneurysm Maternal Uncle      Brain Hemorrhage Maternal Aunt            Reviewed and updated as needed this visit by clinical staff  Tobacco  Allergies   "Meds       Reviewed and updated as needed this visit by Provider         ROS:  Constitutional, HEENT, cardiovascular, pulmonary, gi and gu systems are negative, except as otherwise noted.    OBJECTIVE:     /83 (BP Location: Right arm, Cuff Size: Adult Regular)   Pulse 114   Temp 98.4  F (36.9  C) (Oral)   Resp 16   Ht 1.638 m (5' 4.5\")   Wt 83.9 kg (185 lb)   SpO2 97%   Breastfeeding? Yes   BMI 31.26 kg/m    Body mass index is 31.26 kg/m .  GENERAL: healthy, alert and no distress  HENT: ear canals and TM's normal, nose and mouth without ulcers or lesions  NECK: no adenopathy, no asymmetry, masses, or scars and thyroid normal to palpation  RESP: lungs clear to auscultation - no rales, rhonchi or wheezes  CV: regular rate and rhythm, normal S1 S2, no S3 or S4, no murmur, click or rub, no peripheral edema and peripheral pulses strong  ABDOMEN: soft, nontender, no hepatosplenomegaly, no masses and bowel sounds normal  MS: no gross musculoskeletal defects noted, no edema    Diagnostic Test Results:  none     ASSESSMENT/PLAN:     1. Acute non-recurrent maxillary sinusitis  Plan:most likely started off as viral-symtommatic treatment     Analgesics (NSAIDs, Acetaminophen)    Saline nasal sprays (irrigation)    - fluticasone (FLONASE) 50 MCG/ACT nasal spray; Spray 1 spray into both nostrils daily  Dispense: 1 Bottle; Refill: 1  - loratadine (CLARITIN REDITABS) 10 MG ODT; Take 1 tablet (10 mg) by mouth daily  Dispense: 30 tablet; Refill: 3      If in next few days, the sinus drainage colored, thicker or sinus headache- then or to start antibiotic for superadded bacterial infec  - amoxicillin-clavulanate (AUGMENTIN) 875-125 MG tablet; Take 1 tablet by mouth 2 times daily  Dispense: 20 tablet; Refill: 0    Potential medication side effects were discussed with the patient; let me know if any occur.    Eli Collier MD  [facility]    "

## 2019-06-19 ENCOUNTER — TELEPHONE (OUTPATIENT)
Dept: FAMILY MEDICINE | Facility: CLINIC | Age: 39
End: 2019-06-19

## 2019-06-19 NOTE — TELEPHONE ENCOUNTER
Patient had normal pap done on 3/13/2018- routed to abstraction.    Eve Dinh MA  Medical Assistant  [facility]

## 2020-04-06 ENCOUNTER — VIRTUAL VISIT (OUTPATIENT)
Dept: FAMILY MEDICINE | Facility: CLINIC | Age: 40
End: 2020-04-06
Payer: COMMERCIAL

## 2020-04-06 DIAGNOSIS — G43.009 MIGRAINE WITHOUT AURA AND WITHOUT STATUS MIGRAINOSUS, NOT INTRACTABLE: ICD-10-CM

## 2020-04-06 DIAGNOSIS — F33.41 RECURRENT MAJOR DEPRESSION IN PARTIAL REMISSION (H): ICD-10-CM

## 2020-04-06 DIAGNOSIS — K21.9 GASTROESOPHAGEAL REFLUX DISEASE WITHOUT ESOPHAGITIS: ICD-10-CM

## 2020-04-06 DIAGNOSIS — L30.9 ECZEMA, UNSPECIFIED TYPE: ICD-10-CM

## 2020-04-06 DIAGNOSIS — L20.9 ATOPIC DERMATITIS, UNSPECIFIED TYPE: Primary | ICD-10-CM

## 2020-04-06 PROCEDURE — 99214 OFFICE O/P EST MOD 30 MIN: CPT | Mod: GT | Performed by: FAMILY MEDICINE

## 2020-04-06 RX ORDER — IVERMECTIN 10 MG/G
CREAM TOPICAL DAILY
COMMUNITY
End: 2021-08-27

## 2020-04-06 RX ORDER — BUPROPION HYDROCHLORIDE 100 MG/1
100 TABLET, EXTENDED RELEASE ORAL 2 TIMES DAILY
COMMUNITY

## 2020-04-06 RX ORDER — SPIRONOLACTONE 50 MG/1
50 TABLET, FILM COATED ORAL DAILY
COMMUNITY

## 2020-04-06 NOTE — NURSING NOTE
"Chief Complaint   Patient presents with     Video Visit     Puffy Eye     There were no vitals taken for this visit. Estimated body mass index is 31.26 kg/m  as calculated from the following:    Height as of 2/6/19: 1.638 m (5' 4.5\").    Weight as of 2/6/19: 83.9 kg (185 lb).  Medication Reconciliation: complete        Health Maintenance Due Pending Provider Review:  NONE    n/a    Eve Dinh MA  [facility]  [phone]  "

## 2020-04-06 NOTE — PROGRESS NOTES
"Andria Silva is a 40 year old female who is being evaluated via a billable video visit.      The patient has been notified of following:     \"This video visit will be conducted via a call between you and your physician/provider. We have found that certain health care needs can be provided without the need for an in-person physical exam.  This service lets us provide the care you need with a video conversation.  If a prescription is necessary we can send it directly to your pharmacy.  If lab work is needed we can place an order for that and you can then stop by our lab to have the test done at a later time.    If during the course of the call the physician/provider feels a video visit is not appropriate, you will not be charged for this service.\"     Patient has given verbal consent for Video visit? Yes    Patient would like the video invitation sent by: Text to cell phone: [phone]    Video Start Time: 1259    Andria Silva complains of    Chief Complaint   Patient presents with     Video Visit     Puffy Eye     She reports puffiness both eyes and right side of the face.  The face feels tighter, slight redness and rash.  She reports noting puffiness and redness yesterday, took some Benadryl and was able to sleep.  This morning again eyes are puffy again more on right side of the face.  There is no drooling no numbness no tingling sensation.  She reports no change in face wash detergent.  She reports no known food allergies.  She does have history of eczema that flares up on and off.  She reports overall in usual state of good health.  Has been working from home.    She has history of depression currently taking Wellbutrin  mg twice daily under care of primary care physician denies any concerns with medication or side effect.  She also has history of GERD and migraine no acute changes in medication no acute concerns  I have reviewed and updated the patient's Past Medical History, Social History, " "Family History and Medication List.    ALLERGIES  Patient has no known allergies.    Additional provider notes: insert own note template here Andria Silva is a 40 year old female who is being evaluated via a billable video visit.      The patient has been notified of following:     \"This video visit will be conducted via a call between you and your physician/provider. We have found that certain health care needs can be provided without the need for an in-person physical exam.  This service lets us provide the care you need with a video conversation.  If a prescription is necessary we can send it directly to your pharmacy.  If lab work is needed we can place an order for that and you can then stop by our lab to have the test done at a later time.    Video visits are billed at different rates depending on your insurance coverage.  Please reach out to your insurance provider with any questions.    If during the course of the call the physician/provider feels a video visit is not appropriate, you will not be charged for this service.\"    Patient has given verbal consent for Video visit? Yes    How would you like to obtain your AVS? E-Mail (inform patient AVS not encrypted)    Patient would like the video invitation sent by: Text to cell phone: . [phone]    Subjective     R       Review of Systems   ROS COMP: Constitutional, HEENT, cardiovascular, pulmonary, GI, , musculoskeletal, neuro, skin, endocrine and psych systems are negative, except as otherwise noted.      Objective    There were no vitals taken for this visit.  Estimated body mass index is 31.26 kg/m  as calculated from the following:    Height as of 2/6/19: 1.638 m (5' 4.5\").    Weight as of 2/6/19: 83.9 kg (185 lb).  Physical Exam   GENERAL: healthy, alert and no distress  EYES: Eyes grossly normal to inspection  HENT: normal cephalic/atraumatic, nose and mouth without ulcers or lesions and oral mucous membranes moist  MS: no gross " musculoskeletal defects noted, no edema  SKIN: no suspicious lesions or rashes  NEURO: mentation intact and speech normal  PSYCH: mentation appears normal, affect normal/bright    Diagnostic Test Results:  Labs reviewed in Epic        Assessment & Plan   40-year old female with the reported puffy eyes and redness on face.  On video visit the face is quite clear and I did not appreciate the periorbital or puffiness of eyes.  It is possible that the redness puffiness happened and reaction to allergen either environmental or food.  She is advised to keep a journal of her symptoms.  For now short course of prednisone is sent to her pharmacy.  Meanwhile over-the-counter antihistamine Zyrtec daily as needed Benadryl at bedtime as needed is adequate.  1. Dermatitis , unspecified type  Preferred over-the-counter antihistamine Zyrtec daily as needed Benadryl at bedtime as needed.  In case of flareup short course of prednisone and to call us back.  - predniSONE (DELTASONE) 10 MG tablet; Take 1 tablet (10 mg) by mouth daily for 3 days  Dispense: 3 tablet; Refill: 0    2. Gastroesophageal reflux disease without esophagitis  No acute concerns.    3. Recurrent major depression in partial remission (H)  PHQ 5/4/2016 12/21/2016 3/27/2018   PHQ-9 Total Score 10 4 2   Q9: Thoughts of better off dead/self-harm past 2 weeks Not at all Not at all Not at all     Under care of primary care physician.  She is currently taking Wellbutrin 100 mg twice daily.  Denies any acute concerns no suicidal thoughts.  4. Migraine without aura and without status migrainosus, not intractable  No acute concerns.         See Patient Instructions    Return in about 1 week (around 4/13/2020) for concerns,unresolved.    Eli Collier MD  [facility]      Video-Visit Details    Type of service:  Video Visit    Video End Time (time video stopped):115pm  Originating Location (pt. Location): Home    Distant Location (provider location):  [facility]     Mode of Communication:  Video Conference via [employer]    Return in about 1 week (around 4/13/2020) for concerns,unresolved.       Eli Collier MD        Video-Visit Details    Type of service:  Video Visit    Video End Time (time video stopped):115    Originating Location (pt. Location): Home    Distant Location (provider location):  [facility]     Mode of Communication:  Video Conference via [employer]      Eli Collier MD

## 2020-04-07 ENCOUNTER — TELEPHONE (OUTPATIENT)
Dept: FAMILY MEDICINE | Facility: CLINIC | Age: 40
End: 2020-04-07

## 2020-04-07 RX ORDER — PREDNISONE 10 MG/1
10 TABLET ORAL DAILY
Qty: 3 TABLET | Refills: 0 | Status: SHIPPED | OUTPATIENT
Start: 2020-04-07 | End: 2020-04-10

## 2020-04-07 NOTE — TELEPHONE ENCOUNTER
A.S  Please see below message  Did you discuss sending prednisone?  Thank you,  Jenelle Kelly RN

## 2020-04-07 NOTE — TELEPHONE ENCOUNTER
Reason for Call:  Medication or medication refill:    Do you use a [facility]?  Name of the pharmacy and phone number for the current request:       [facility]  [address] [place] [address]      Name of the medication requested: Prednisone     Other request: pt was expecting this rx to go to the pharmacy yesterday    Can we leave a detailed message on this number? YES    Phone number patient can be reached at: Home number on file [phone] (home)    Best Time: any    Call taken on 4/7/2020 at 9:10 AM by Donna Neal

## 2020-04-07 NOTE — TELEPHONE ENCOUNTER
Yes- sorry- it was in pedning medications- it was discussed and sent. Please call patient and inform. Thank s

## 2020-04-10 NOTE — PATIENT INSTRUCTIONS
Observe symptoms, monitor any correlation with allergens.  Over-the-counter antihistamine Zyrtec daily as needed and Benadryl at bedtime as needed is okay.  Follow-up in 1 week for recurring symptoms follow-up earlier with more concerns

## 2021-04-04 ENCOUNTER — HEALTH MAINTENANCE LETTER (OUTPATIENT)
Age: 41
End: 2021-04-04

## 2021-08-27 ENCOUNTER — VIRTUAL VISIT (OUTPATIENT)
Dept: FAMILY MEDICINE | Facility: CLINIC | Age: 41
End: 2021-08-27
Payer: COMMERCIAL

## 2021-08-27 ENCOUNTER — NURSE TRIAGE (OUTPATIENT)
Dept: NURSING | Facility: CLINIC | Age: 41
End: 2021-08-27

## 2021-08-27 DIAGNOSIS — M54.41 ACUTE RIGHT-SIDED LOW BACK PAIN WITH RIGHT-SIDED SCIATICA: Primary | ICD-10-CM

## 2021-08-27 PROCEDURE — 99213 OFFICE O/P EST LOW 20 MIN: CPT | Mod: GT | Performed by: NURSE PRACTITIONER

## 2021-08-27 RX ORDER — PREDNISONE 10 MG/1
TABLET ORAL
Qty: 20 TABLET | Refills: 0 | Status: SHIPPED | OUTPATIENT
Start: 2021-08-27

## 2021-08-27 RX ORDER — CYCLOBENZAPRINE HCL 5 MG
5-10 TABLET ORAL 3 TIMES DAILY PRN
Qty: 30 TABLET | Refills: 1 | Status: SHIPPED | OUTPATIENT
Start: 2021-08-27

## 2021-08-27 NOTE — TELEPHONE ENCOUNTER
Patient bent over to put lotion on her legs. Pain that took her breath away. Pain was severe that lasted 30 seconds to a minute and now she has a lower right side pain in the back. Took advil 400mg  and laying in bed on an ice pack. Pain now is 5/10 laying down. Able to urinate.   Advised patient to be seen npw as at times her pain is severe.   Home care suggestions reviewed with caller per RN protocol.   Transferred to scheduling.    COVID 19 Nurse Triage Plan/Patient Instructions    Please be aware that novel coronavirus (COVID-19) may be circulating in the community. If you develop symptoms such as fever, cough, or SOB or if you have concerns about the presence of another infection including coronavirus (COVID-19), please contact your health care provider or visit [email or web address].     Disposition/Instructions    In-Person Visit with provider recommended. Reference Visit Selection Guide.    Thank you for taking steps to prevent the spread of this virus.  o Limit your contact with others.  o Wear a simple mask to cover your cough.  o Wash your hands well and often.    Resources    [facility]: About COVID-19: [email or web address]    CDC: What to Do If You're Sick: www.cdc.gov/coronavirus/2019-ncov/about/steps-when-sick.html    CDC: Ending Home Isolation: www.cdc.gov/coronavirus/2019-ncov/hcp/disposition-in-home-patients.html     CDC: Caring for Someone: www.cdc.gov/coronavirus/2019-ncov/if-you-are-sick/care-for-someone.html     [facility]: Interim Guidance for Hospital Discharge to Home: www.health.[facility].mn.us/diseases/coronavirus/hcp/hospdischarge.pdf    [facility] clinical trials (COVID-19 research studies): clinicalaffairs.[email or web address].edu/um-clinical-trials     Below are the COVID-19 hotlines at the [facility] of Health ([facility]). Interpreters are available.   o For health questions: Call 249-329-4426 or 1-416.265.3918 (7 a.m. to 7 p.m.)  o For questions about schools and  childcare: Call 490-952-5632 or 1-671.158.9223 (7 a.m. to 7 p.m.)     Loretta Stubbs RN, BSN Care Connection Triage Nurse                    Reason for Disposition    SEVERE back pain (e.g., excruciating, unable to do any normal activities) and not improved after pain medicine and CARE ADVICE    Additional Information    Negative: Dangerous mechanism of injury (e.g., MVA, contact sports, trampoline, diving, fall > 10 feet or 3 meters) (Exception: back pain began > 1 hour after injury)    Negative: Weakness (i.e., paralysis, loss of muscle strength) of the leg(s) or foot and sudden onset after back injury    Negative: Numbness (i.e., loss of sensation) of the leg(s) or foot and sudden onset after back injury    Negative: Major bleeding (actively dripping or spurting) that can't be stopped    Negative: Bullet, knife or other serious penetrating wound    Negative: Shock suspected (e.g., cold/pale/clammy skin, too weak to stand, low BP, rapid pulse)    Negative: Sounds like a life-threatening emergency to the triager    Negative: Back pain not from an injury    Negative: Back pain from overuse (work, exercise, gardening) OR from twisting, lifting, or bending injury    Negative: SEVERE pain in kidney area (flank) that follows a direct blow to that site    Negative: Blood in urine (red, pink, or tea-colored)    Negative: Unable to urinate (or only a few drops) > 4 hours and bladder feels very full (e.g., palpable bladder or strong urge to urinate)    Negative: Loss of bladder or bowel control (urine or bowel incontinence; wetting self, leaking stool) of new onset    Negative: Numbness (loss of sensation) in groin or rectal area    Negative: Skin is split open or gaping (length > 1/2 inch or 12 mm)    Negative: Puncture wound of back    Negative: Bleeding won't stop after 10 minutes of direct pressure (using correct technique)    Negative: Sounds like a serious injury to the triager    Negative: Weakness of a leg or foot  (e.g., unable to bear weight, dragging foot)    Negative: Numbness of a leg or foot (i.e.., loss of sensation)    Protocols used: BACK INJURY-A-OH

## 2021-08-27 NOTE — PROGRESS NOTES
Andria is a 41 year old who is being evaluated via a billable video visit.      How would you like to obtain your AVS? MyChart  If the video visit is dropped, the invitation should be resent by: Text to cell phone:   Will anyone else be joining your video visit? No      Video Start Time: 1439    Assessment & Plan     Acute right-sided low back pain with right-sided sciatica  Encouraged ice application a few times per day for the next 48 hours followed by alternating with heat. Both flexeril and prednisone prescribed today, discussed possible side effects of medication. Pt to start one medication today and then add in the other medication tomorrow in the chance that she would have a side effect from them then we know which one is causing it. Encouraged walking and stretching of low back. If back pain worsens, if she develops weakness to her RLE or loss of bowel/bladder control then she should be evaluated in the ED right away.   - cyclobenzaprine (FLEXERIL) 5 MG tablet; Take 1-2 tablets (5-10 mg) by mouth 3 times daily as needed for muscle spasms or other (back pain)  - predniSONE (DELTASONE) 10 MG tablet; Take 3 tabs by mouth daily x 3 days, then 2 tabs daily x 3 days, then 1 tab daily x 3 days, then 1/2 tab daily x 3 days.      Return in about 1 week (around 9/3/2021) for If symptoms worsen or fail to improve.    Ana Luisa Mcmillan NP  [facility]   Andria is a 41 year old who presents for the following health issues     HPI     Back Pain  Onset/Duration: back pain started this morning. Was standing at her desk and bent over to apply lotion to her legs  Description:   Location of pain: low back right  Character of pain: sharp with movment and stabbing sensation   hurts with weight bearing on that leg.    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   History of back problems: no prior back problems  Any previous MRI or X-rays: None  Sees a specialist for back pain: No  Precipitating factors:  Worsened by: twisting, sitting, bearing weight on RLE   Therapies tried and outcome: advil & ice    Accompanying Signs & Symptoms:  Risk of Fracture: None  Risk of Cauda Equina: None  Risk of Infection: None  Risk of Cancer: None  Risk of Ankylosing Spondylitis: Onset at age <35, male, AND morning back stiffness  no     Pain is constant but worsens with certain movements. She states her right low back feels tight. Denies recent falls. Denies loss of bowel/bladder control. She reports having feeling to her RLE still. She reports favoring RLE with ambulation       Review of Systems   Constitutional, HEENT, cardiovascular, pulmonary, gi and gu systems are negative, except back pain       Objective           Vitals:  No vitals were obtained today due to virtual visit.    Physical Exam   GENERAL: Healthy, alert and no distress  EYES: Eyes grossly normal to inspection.  No discharge or erythema, or obvious scleral/conjunctival abnormalities.  RESP: No audible wheeze, cough, or visible cyanosis.  No visible retractions or increased work of breathing.    SKIN: Visible skin clear. No significant rash, abnormal pigmentation or lesions.  NEURO: Cranial nerves grossly intact.  Mentation and speech appropriate for age.  PSYCH: Mentation appears normal, affect normal/bright, judgement and insight intact, normal speech and appearance well-groomed.    She is laying in bed during video visit     Video-Visit Details    Type of service:  Video Visit    Video End Time:1452    Originating Location (pt. Location): Home    Distant Location (provider location):  [facility]     Platform used for Video Visit: [employer]

## 2021-08-27 NOTE — PATIENT INSTRUCTIONS
Self-Care for Back Pain    Principles to encourage healing of musculoskeletal back pain:  1) Reduce Strain  2) Practice Relaxation  3) Good Posture  4) Progressive Stretching  5) Get a good night's sleep    Avoid muscle tensing habits and activities that put strain on the back.  Remind yourself regularly to determine if you are doing any tensing habits. Use reminders methods such as stickers or timers.  If noticed, replace these negative habits with a positive habit of having your head up, chest up, chin in, and shoulders down and back.       Back tensing    Sleeping on your stomach    Tensing or twist the back often    Sitting for hours at a time most days    Lifting objects by bending at the waist   Slouching while standing or walking    Slouching forward while sitting in a chair    Carrying heavy objects while bent at waist     Twisting the back to one side when working     Avoid tensing and poor posture while driving     Avoid events or activities that trigger the pain.  Use a pain diary to review daily activities that aggravate the pain and change your behavior.    Practice relaxation and deep breathing  Lay down on your back and take a deep breath from your abdomen up. Let it out slowly as you allow your back, neck and shoulders to relax.  This can calm you, reduce stress, and decrease muscle tensing.     Good Posture  Sitting:  Keep your butt to the back of the chair and sit with your shoulders and head up. This will help reduce strain to the back muscles.  Closely monitor your back position (under your shoulders) to maintain balanced and relaxed head, neck, shoulder and back muscles.  Support your mid back with a rolled towel and lean back so that your the shoulders fall back, your neck is relaxed, and your head is up.    Lifting:  Use the squat lift for heavy objects. Start with the object between your legs.  Squat down, keep your head up, shoulders back, and spine erect.  Bend at the hips, knees, and  ankles as you lift.   Use the golfer's lift for light objects by using some support (a chair, a desk, or a putter).  Put your hand on the support to take the load off your back as you bend over.      Progressive Stretching.  Stretch the back and hip muscles and joints.  One option would be to use sun salutation yoga exercises.   Touch the toes, reach above, one leg out, other leg, butt up, butt down, other leg up, and stretch.  This routine will stretch tight paraspinous, hamstrings, hip, and quadratus lumborum muscles.  Gently stretch 4 rotations for 2 times per day to loosen up tight backs.     Get a good night s sleep with a good semi-firm mattress.  Avoid caffeinated beverages (coffee, tea, and soft drinks) later in the day.  Improve your sleep environment.  Reduce light and noise and lie on a comfortable mattress.  Reduce stimulating activities in the late evening including computer work and exercising.  Avoid sleeping on your stomach.     Apply heat or cold and massage tender muscles.   Heat or ice applications used up to four times per day can relax the muscles and reduce pain. For heat, microwave a wet towel for approximately 1 minute or until towel is warm and wrap around a hot-water bottle or heated gel pack and apply for 15 to 20 minutes. For cold, use ice wrapped in a thin cloth on the area until you first feel some numbness. Use what feels best. Heat is often used for more chronic pain conditions and cold for acute conditions. Massage tender points in the muscles    Use anti-inflammatory and pain-reducing medications.  Short-term and occasional use of over-the-counter ibuprofen, naproxen, acetaminophen, or aspirin (without caffeine) can reduce joint and muscle pain.  Be sure to check with your provider to be sure this would be safe.  Please note that extended daily use can sometimes cause rebound pain and an extend your symptoms.

## 2021-08-30 ENCOUNTER — MYC MEDICAL ADVICE (OUTPATIENT)
Dept: FAMILY MEDICINE | Facility: CLINIC | Age: 41
End: 2021-08-30

## 2021-09-18 ENCOUNTER — HEALTH MAINTENANCE LETTER (OUTPATIENT)
Age: 41
End: 2021-09-18

## 2022-04-30 ENCOUNTER — HEALTH MAINTENANCE LETTER (OUTPATIENT)
Age: 42
End: 2022-04-30

## 2022-11-20 ENCOUNTER — HEALTH MAINTENANCE LETTER (OUTPATIENT)
Age: 42
End: 2022-11-20

## 2023-06-01 ENCOUNTER — HEALTH MAINTENANCE LETTER (OUTPATIENT)
Age: 43
End: 2023-06-01

## 2024-04-07 ENCOUNTER — HEALTH MAINTENANCE LETTER (OUTPATIENT)
Age: 44
End: 2024-04-07

## 2024-06-16 ENCOUNTER — HEALTH MAINTENANCE LETTER (OUTPATIENT)
Age: 44
End: 2024-06-16